# Patient Record
Sex: MALE | Race: BLACK OR AFRICAN AMERICAN | NOT HISPANIC OR LATINO | URBAN - METROPOLITAN AREA
[De-identification: names, ages, dates, MRNs, and addresses within clinical notes are randomized per-mention and may not be internally consistent; named-entity substitution may affect disease eponyms.]

---

## 2018-02-17 ENCOUNTER — EMERGENCY (EMERGENCY)
Facility: HOSPITAL | Age: 38
LOS: 0 days | Discharge: HOME | End: 2018-02-17
Attending: EMERGENCY MEDICINE
Payer: SELF-PAY

## 2018-02-17 VITALS
HEART RATE: 60 BPM | WEIGHT: 134.92 LBS | TEMPERATURE: 98 F | DIASTOLIC BLOOD PRESSURE: 67 MMHG | SYSTOLIC BLOOD PRESSURE: 111 MMHG | RESPIRATION RATE: 20 BRPM | OXYGEN SATURATION: 100 %

## 2018-02-17 DIAGNOSIS — L23.9 ALLERGIC CONTACT DERMATITIS, UNSPECIFIED CAUSE: ICD-10-CM

## 2018-02-17 DIAGNOSIS — B35.6 TINEA CRURIS: ICD-10-CM

## 2018-02-17 DIAGNOSIS — R21 RASH AND OTHER NONSPECIFIC SKIN ERUPTION: ICD-10-CM

## 2018-02-17 PROCEDURE — 99283 EMERGENCY DEPT VISIT LOW MDM: CPT

## 2018-02-17 RX ORDER — DESONIDE OINTMENT, 0.05% 0.5 MG/G
1 OINTMENT TOPICAL
Qty: 1 | Refills: 0 | OUTPATIENT
Start: 2018-02-17 | End: 2018-02-23

## 2018-02-17 RX ORDER — DIPHENHYDRAMINE HCL 50 MG
1 CAPSULE ORAL
Qty: 15 | Refills: 0 | OUTPATIENT
Start: 2018-02-17 | End: 2018-02-21
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
## 2018-02-17 NOTE — ED ADULT TRIAGE NOTE - CHIEF COMPLAINT QUOTE
patient c/o of rash over , patient also c/o of rash on his penis and groin aria started 4 days ago , denied dysuria , no blood in urine , denied any discharge

## 2018-02-17 NOTE — ED ADULT NURSE NOTE - OBJECTIVE STATEMENT
pt states he  rash tot he groin and penis area for the past four days rash left arm  notice it yesterday denies any sob, difficulty swallowing

## 2018-02-17 NOTE — ED PROVIDER NOTE - OBJECTIVE STATEMENT
37 year old male with no significant PMH presenting after trying to use tea tree oil to treat tinea cruris - had been using lamisil on R thigh area for 4 days without improvement so started trying tea prem oil and noted redness, inflammation then swelling immediately after application to groin region. States otherwise after washing off skin stayed swollen and inflamed so came in for evaluation. Denies any penile discharge and denies STD exposure.
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
